# Patient Record
Sex: FEMALE | Race: BLACK OR AFRICAN AMERICAN | NOT HISPANIC OR LATINO | Employment: FULL TIME | URBAN - METROPOLITAN AREA
[De-identification: names, ages, dates, MRNs, and addresses within clinical notes are randomized per-mention and may not be internally consistent; named-entity substitution may affect disease eponyms.]

---

## 2018-02-04 ENCOUNTER — HOSPITAL ENCOUNTER (EMERGENCY)
Facility: HOSPITAL | Age: 58
Discharge: HOME | End: 2018-02-04
Attending: EMERGENCY MEDICINE
Payer: MEDICAID

## 2018-02-04 VITALS
TEMPERATURE: 98 F | RESPIRATION RATE: 18 BRPM | SYSTOLIC BLOOD PRESSURE: 156 MMHG | HEIGHT: 65 IN | WEIGHT: 260 LBS | OXYGEN SATURATION: 100 % | BODY MASS INDEX: 43.32 KG/M2 | DIASTOLIC BLOOD PRESSURE: 63 MMHG | HEART RATE: 96 BPM

## 2018-02-04 DIAGNOSIS — H60.502 ACUTE OTITIS EXTERNA OF LEFT EAR, UNSPECIFIED TYPE: ICD-10-CM

## 2018-02-04 DIAGNOSIS — I10 HYPERTENSION: ICD-10-CM

## 2018-02-04 DIAGNOSIS — H81.392 PERIPHERAL VERTIGO INVOLVING LEFT EAR: ICD-10-CM

## 2018-02-04 DIAGNOSIS — R07.9 CHEST PAIN, UNSPECIFIED TYPE: ICD-10-CM

## 2018-02-04 DIAGNOSIS — N76.2 CELLULITIS OF LABIA: Primary | ICD-10-CM

## 2018-02-04 LAB
ALBUMIN SERPL BCP-MCNC: 3.3 G/DL
ALP SERPL-CCNC: 116 U/L
ALT SERPL W/O P-5'-P-CCNC: 18 U/L
ANION GAP SERPL CALC-SCNC: 9 MMOL/L
AST SERPL-CCNC: 16 U/L
BACTERIA #/AREA URNS HPF: ABNORMAL /HPF
BASOPHILS # BLD AUTO: 0.04 K/UL
BASOPHILS NFR BLD: 0.5 %
BILIRUB SERPL-MCNC: 0.3 MG/DL
BILIRUB UR QL STRIP: NEGATIVE
BNP SERPL-MCNC: <10 PG/ML
BUN SERPL-MCNC: 11 MG/DL
CALCIUM SERPL-MCNC: 9.1 MG/DL
CHLORIDE SERPL-SCNC: 104 MMOL/L
CLARITY UR: ABNORMAL
CO2 SERPL-SCNC: 25 MMOL/L
COLOR UR: YELLOW
CREAT SERPL-MCNC: 0.9 MG/DL
DIFFERENTIAL METHOD: ABNORMAL
EOSINOPHIL # BLD AUTO: 0.2 K/UL
EOSINOPHIL NFR BLD: 3.1 %
ERYTHROCYTE [DISTWIDTH] IN BLOOD BY AUTOMATED COUNT: 14 %
EST. GFR  (AFRICAN AMERICAN): >60 ML/MIN/1.73 M^2
EST. GFR  (NON AFRICAN AMERICAN): >60 ML/MIN/1.73 M^2
GLUCOSE SERPL-MCNC: 281 MG/DL
GLUCOSE UR QL STRIP: ABNORMAL
HCT VFR BLD AUTO: 36.1 %
HGB BLD-MCNC: 12.7 G/DL
HGB UR QL STRIP: NEGATIVE
INR PPP: 1.1
KETONES UR QL STRIP: NEGATIVE
LEUKOCYTE ESTERASE UR QL STRIP: NEGATIVE
LYMPHOCYTES # BLD AUTO: 3.6 K/UL
LYMPHOCYTES NFR BLD: 48.4 %
MCH RBC QN AUTO: 28.6 PG
MCHC RBC AUTO-ENTMCNC: 35.2 G/DL
MCV RBC AUTO: 81 FL
MICROSCOPIC COMMENT: ABNORMAL
MONOCYTES # BLD AUTO: 0.4 K/UL
MONOCYTES NFR BLD: 5.3 %
NEUTROPHILS # BLD AUTO: 3.1 K/UL
NEUTROPHILS NFR BLD: 42.4 %
NITRITE UR QL STRIP: NEGATIVE
PH UR STRIP: 6 [PH] (ref 5–8)
PLATELET # BLD AUTO: 281 K/UL
PMV BLD AUTO: 9.7 FL
POTASSIUM SERPL-SCNC: 4 MMOL/L
PROT SERPL-MCNC: 6.8 G/DL
PROT UR QL STRIP: ABNORMAL
PROTHROMBIN TIME: 11.2 SEC
RBC # BLD AUTO: 4.44 M/UL
RBC #/AREA URNS HPF: 0 /HPF (ref 0–4)
SODIUM SERPL-SCNC: 138 MMOL/L
SP GR UR STRIP: >=1.03 (ref 1–1.03)
SQUAMOUS #/AREA URNS HPF: 20 /HPF
TROPONIN I SERPL DL<=0.01 NG/ML-MCNC: <0.006 NG/ML
TROPONIN I SERPL DL<=0.01 NG/ML-MCNC: <0.006 NG/ML
URN SPEC COLLECT METH UR: ABNORMAL
UROBILINOGEN UR STRIP-ACNC: NEGATIVE EU/DL
WBC # BLD AUTO: 7.37 K/UL
WBC #/AREA URNS HPF: 3 /HPF (ref 0–5)
YEAST URNS QL MICRO: ABNORMAL

## 2018-02-04 PROCEDURE — 25000003 PHARM REV CODE 250: Performed by: EMERGENCY MEDICINE

## 2018-02-04 PROCEDURE — 96375 TX/PRO/DX INJ NEW DRUG ADDON: CPT

## 2018-02-04 PROCEDURE — 80053 COMPREHEN METABOLIC PANEL: CPT

## 2018-02-04 PROCEDURE — 81000 URINALYSIS NONAUTO W/SCOPE: CPT

## 2018-02-04 PROCEDURE — 85610 PROTHROMBIN TIME: CPT

## 2018-02-04 PROCEDURE — 93010 ELECTROCARDIOGRAM REPORT: CPT | Mod: ,,, | Performed by: INTERNAL MEDICINE

## 2018-02-04 PROCEDURE — 85025 COMPLETE CBC W/AUTO DIFF WBC: CPT

## 2018-02-04 PROCEDURE — 84484 ASSAY OF TROPONIN QUANT: CPT | Mod: 91

## 2018-02-04 PROCEDURE — S0077 INJECTION, CLINDAMYCIN PHOSP: HCPCS | Performed by: EMERGENCY MEDICINE

## 2018-02-04 PROCEDURE — 83880 ASSAY OF NATRIURETIC PEPTIDE: CPT

## 2018-02-04 PROCEDURE — 93005 ELECTROCARDIOGRAM TRACING: CPT

## 2018-02-04 PROCEDURE — 96365 THER/PROPH/DIAG IV INF INIT: CPT

## 2018-02-04 PROCEDURE — 63600175 PHARM REV CODE 636 W HCPCS: Performed by: EMERGENCY MEDICINE

## 2018-02-04 PROCEDURE — 99284 EMERGENCY DEPT VISIT MOD MDM: CPT | Mod: 25

## 2018-02-04 RX ORDER — KETOROLAC TROMETHAMINE 30 MG/ML
15 INJECTION, SOLUTION INTRAMUSCULAR; INTRAVENOUS
Status: COMPLETED | OUTPATIENT
Start: 2018-02-04 | End: 2018-02-04

## 2018-02-04 RX ORDER — HYDROCODONE BITARTRATE AND ACETAMINOPHEN 5; 325 MG/1; MG/1
1 TABLET ORAL EVERY 6 HOURS PRN
COMMUNITY

## 2018-02-04 RX ORDER — PROCHLORPERAZINE EDISYLATE 5 MG/ML
10 INJECTION INTRAMUSCULAR; INTRAVENOUS
Status: COMPLETED | OUTPATIENT
Start: 2018-02-04 | End: 2018-02-04

## 2018-02-04 RX ORDER — LISINOPRIL 10 MG/1
10 TABLET ORAL DAILY
COMMUNITY

## 2018-02-04 RX ORDER — METHOCARBAMOL 500 MG/1
500 TABLET, FILM COATED ORAL 4 TIMES DAILY
COMMUNITY

## 2018-02-04 RX ORDER — AMLODIPINE BESYLATE 10 MG/1
10 TABLET ORAL DAILY
COMMUNITY

## 2018-02-04 RX ORDER — CLINDAMYCIN HYDROCHLORIDE 150 MG/1
450 CAPSULE ORAL EVERY 8 HOURS
Qty: 63 CAPSULE | Refills: 0 | Status: SHIPPED | OUTPATIENT
Start: 2018-02-04 | End: 2018-02-11

## 2018-02-04 RX ORDER — PAROXETINE HYDROCHLORIDE HEMIHYDRATE 12.5 MG/1
12.5 TABLET, FILM COATED, EXTENDED RELEASE ORAL EVERY MORNING
COMMUNITY

## 2018-02-04 RX ORDER — ASPIRIN 325 MG
325 TABLET ORAL
Status: DISCONTINUED | OUTPATIENT
Start: 2018-02-04 | End: 2018-02-04 | Stop reason: HOSPADM

## 2018-02-04 RX ORDER — CYCLOBENZAPRINE HCL 10 MG
10 TABLET ORAL 3 TIMES DAILY PRN
COMMUNITY

## 2018-02-04 RX ORDER — MECLIZINE HYDROCHLORIDE 25 MG/1
25 TABLET ORAL
Status: DISCONTINUED | OUTPATIENT
Start: 2018-02-04 | End: 2018-02-04 | Stop reason: HOSPADM

## 2018-02-04 RX ORDER — CLINDAMYCIN PHOSPHATE 900 MG/50ML
900 INJECTION, SOLUTION INTRAVENOUS
Status: COMPLETED | OUTPATIENT
Start: 2018-02-04 | End: 2018-02-04

## 2018-02-04 RX ORDER — MECLIZINE HYDROCHLORIDE 25 MG/1
25 TABLET ORAL EVERY 8 HOURS PRN
Qty: 14 TABLET | Refills: 0 | Status: SHIPPED | OUTPATIENT
Start: 2018-02-04

## 2018-02-04 RX ADMIN — SODIUM CHLORIDE 1000 ML: 0.9 INJECTION, SOLUTION INTRAVENOUS at 04:02

## 2018-02-04 RX ADMIN — PROCHLORPERAZINE EDISYLATE 10 MG: 5 INJECTION INTRAMUSCULAR; INTRAVENOUS at 04:02

## 2018-02-04 RX ADMIN — KETOROLAC TROMETHAMINE 15 MG: 30 INJECTION, SOLUTION INTRAMUSCULAR at 04:02

## 2018-02-04 RX ADMIN — CLINDAMYCIN IN 5 PERCENT DEXTROSE 900 MG: 18 INJECTION, SOLUTION INTRAVENOUS at 04:02

## 2018-02-04 NOTE — ED PROVIDER NOTES
Encounter Date: 2/4/2018       History     Chief Complaint   Patient presents with    Dizziness     pt referred to ed from urgent care in [facility] after being seen and evaluated for episodes of dizziness since december, left sided jaw and ear pain, chest discomfort, and hypertension.     Hypertension     57-year-old female sent to the emergency Department from urgent care for evaluation of multiple complaints.  Patient initially went to urgent care for swelling to her right labia she believes may bean infection.  She reports onset of week or 2 ago, but it is worsened over the last 24-36 hours.  She describes a constant aching pain to the area that is worse with movement and palpation, no alleviating factors reported.o drainage.  No fever.  Patient also reports a right-sided ear pain that radiates into her neck.  She reports that his been there for a few weeks, and she has had intermittent drainage from her left ear.  Reports the pain is throbbing and radiating, worse with movement of her head and without alleviating factors. Also reports that she has some intermittent dizziness and feels that she is leaning towards her left.  This is accompanied by nausea.  It is elicited by moving her head towards the left, but is only intermittent.  Last notices symptoms morning.  She was noted to be somewhat hypertensive at urgent care and she did admit to months of intermittent aching left-sided chest pain.  She does report she has had that symptom today, less noticed a few hours prior to arrival.  No shortness of breath.  Not exertional.  No exacerbating, alleviating, or eliciting factors noted, she just reports that it sometimes hurts in her left chest and aches. Denies any recent cough, congestion, fever, shortness of breath, nausea, vomiting.  No other symptoms reported.  Has not seen her primary care physician for these complaints.          Review of patient's allergies indicates:  No Known Allergies  Past Medical History:    Diagnosis Date    Depression     Hiatal hernia     Hypertension      Past Surgical History:   Procedure Laterality Date    bladder lift      CYST REMOVAL      HYSTERECTOMY      partia    JOINT REPLACEMENT       History reviewed. No pertinent family history.  Social History   Substance Use Topics    Smoking status: Never Smoker    Smokeless tobacco: Never Used    Alcohol use No     Review of Systems   Constitutional: Negative for chills, fatigue and fever.   HENT: Positive for congestion, ear discharge and ear pain. Negative for sore throat and voice change.    Eyes: Negative for photophobia, pain and redness.   Respiratory: Negative for cough, choking and shortness of breath.    Cardiovascular: Positive for chest pain. Negative for palpitations and leg swelling.   Gastrointestinal: Negative for abdominal pain, diarrhea and vomiting.   Genitourinary: Negative for dysuria, frequency and urgency.   Musculoskeletal: Positive for neck pain (Left sided). Negative for back pain and neck stiffness.   Neurological: Negative for seizures, speech difficulty, light-headedness, numbness and headaches.   All other systems reviewed and are negative.      Physical Exam     Initial Vitals [02/04/18 1427]   BP Pulse Resp Temp SpO2   (!) 225/108 88 20 96.4 °F (35.8 °C) 98 %      MAP       147         Physical Exam    Nursing note and vitals reviewed.  Constitutional: She appears well-developed and well-nourished. No distress.   HENT:   Head: Normocephalic and atraumatic.   Oropharynx clear; Dry MM; L TM erythematous, partially obscured by cerumen   Eyes: Conjunctivae and EOM are normal. Pupils are equal, round, and reactive to light.   Neck: Normal range of motion. Neck supple. No tracheal deviation present.   Cardiovascular: Normal rate, regular rhythm, normal heart sounds and intact distal pulses.   Pulmonary/Chest: Breath sounds normal. No respiratory distress. She has no wheezes. She has no rhonchi. She has no rales.    Abdominal: Soft. Bowel sounds are normal. She exhibits no distension. There is no tenderness.   Genitourinary:   Genitourinary Comments: Minimal swelling to the right labia, somewhat erythematous and tender, without palpable or drainable abscess   Musculoskeletal: Normal range of motion. She exhibits no tenderness.   Neurological: She is alert and oriented to person, place, and time. She has normal strength. No cranial nerve deficit or sensory deficit.   Skin: Skin is warm and dry. Capillary refill takes less than 2 seconds.         ED Course   Procedures  Labs Reviewed   CBC W/ AUTO DIFFERENTIAL - Abnormal; Notable for the following:        Result Value    Hematocrit 36.1 (*)     MCV 81 (*)     Lymph% 48.4 (*)     All other components within normal limits   COMPREHENSIVE METABOLIC PANEL - Abnormal; Notable for the following:     Glucose 281 (*)     Albumin 3.3 (*)     All other components within normal limits   URINALYSIS - Abnormal; Notable for the following:     Appearance, UA Hazy (*)     Specific Gravity, UA >=1.030 (*)     Protein, UA Trace (*)     Glucose, UA 3+ (*)     All other components within normal limits   URINALYSIS MICROSCOPIC - Abnormal; Notable for the following:     Bacteria, UA Moderate (*)     All other components within normal limits   PROTIME-INR   B-TYPE NATRIURETIC PEPTIDE   TROPONIN I   TROPONIN I     EKG Readings: (Independently Interpreted)   Initial Reading: No STEMI. Previous EKG: Compared with most recent EKG Previous EKG Date: 5/24/15 (Nonspecific changes) . Rhythm: Normal Sinus Rhythm. Heart Rate: 90. Ectopy: No Ectopy. Conduction: Normal. ST Segments: Normal ST Segments. T Waves: Normal. Axis: Normal.       X-Rays:   Independently Interpreted Readings:   Chest X-Ray: CXR Interpreted by radiologist and visualized by me:      Imaging Results          X-Ray Chest PA And Lateral (Final result)  Result time 02/04/18 15:00:31    Final result by Yahaira Carson MD (02/04/18 15:00:31)                  Impression:     Normal exam      Electronically signed by: RUBI KAISER MD  Date:     02/04/18  Time:    15:00              Narrative:    Procedure: PA and lateral chest.  This is interpreted without the benefit of similar prior studies for comparison .    Findings: PA and lateral radiographs of the chest demonstrate symmetrically inflated lungs with no mass, nodule, pneumothorax, infiltrate or effusion.  The cardiomediastinal silhouette, osseous and soft tissue structures are normal.                              Medical Decision Making:   Initial Assessment:   57-year-old female presents emergency department complaining of swelling and pain to her right labia, left ear pain radiating to her neck, dizziness, chest pain.  Differential Diagnosis:   ACS, dissection, otitis media, otitis externa, peripheral versus central vertigo, pneumonia, electrolyte dyscrasia, arrhythmia  Independently Interpreted Test(s):   I have ordered and independently interpreted X-rays - see prior notes.  I have ordered and independently interpreted EKG Reading(s) - see prior notes  Clinical Tests:   Lab Tests: Reviewed       <> Summary of Lab: benign  ED Management:  Patient given IV fluid, antibiotics,Toradol, Compazine.  She reports resolution of all of her symptoms at this time except for the labial swelling.  We discussed disposition including home management, prescription for meclizine and clindamycin, instructions to follow-up with her primary care physician, reasons return to emergency room.  Patient expressed good understanding and is comfortable with discharge at this time.                   ED Course      Clinical Impression:   The primary encounter diagnosis was Cellulitis of labia. Diagnoses of Hypertension, Acute otitis externa of left ear, unspecified type, Chest pain, unspecified type, and Peripheral vertigo involving left ear were also pertinent to this visit.    Disposition:   Disposition:  Discharged  Condition: Stable                        Prasad Suazo MD  02/04/18 [id number]

## 2018-02-04 NOTE — ED NOTES
Patient identifiers verified and correct for Leann Bethea.  Pt sitting up in bed, AAO x's 3. Pt stated that she came to the ER with c/o abscess to her vaginal area and left sided ear pain that she can feel in her chest. NAD noted. V/S stable. Pt reports some dizziness following the ear discomfort that she has been having for about a week.   LOC: The patient is awake, alert and aware of environment with an appropriate affect, the patient is oriented x 3 and speaking appropriately.  APPEARANCE: Patient resting comfortably and in no acute distress, patient is clean and well groomed, patient's clothing is properly fastened.  SKIN: The skin is warm and dry, color consistent with ethnicity, patient has normal skin turgor and moist mucus membranes, skin intact, no breakdown or bruising noted.  MUSCULOSKELETAL: Patient moving all extremities spontaneously, no obvious swelling or deformities noted.  RESPIRATORY: Airway is open and patent, respirations are spontaneous, patient has a normal effort and rate, no accessory muscle use noted, bilateral breath sounds clear.  CARDIAC: Patient has a normal rate and regular rhythm, no periphreal edema noted, capillary refill < 3 seconds.  ABDOMEN: Soft and non tender to palpation, no distention noted, normoactive bowel sounds present in all four quadrants.  NEUROLOGIC: PERRL,  3 mm bilaterally, eyes open spontaneously, behavior appropriate to situation, follows commands, facial expression symmetrical, bilateral hand grasp equal and even, purposeful motor response noted, normal sensation in all extremities when touched with a finger.

## 2018-02-09 ENCOUNTER — TELEPHONE (OUTPATIENT)
Dept: NEUROLOGY | Facility: CLINIC | Age: 58
End: 2018-02-09

## 2018-02-09 NOTE — TELEPHONE ENCOUNTER
LM for patient to contact our office to discuss rescheduling appointment of 2-23 with Neurosurgery department for spine/neck/back problems. Patient can also be seen at back/spine clinic at [facility].

## 2022-06-07 ENCOUNTER — TELEPHONE (OUTPATIENT)
Dept: SPORTS MEDICINE | Facility: CLINIC | Age: 62
End: 2022-06-07
Payer: MEDICAID

## 2022-06-07 DIAGNOSIS — M25.512 LEFT SHOULDER PAIN, UNSPECIFIED CHRONICITY: Primary | ICD-10-CM

## 2022-06-07 NOTE — TELEPHONE ENCOUNTER
LVM requesting patient to come in 30 minutes early prior to appointment to have imaging done. Orders have been placed and appointment has been scheduled.

## 2022-06-10 ENCOUNTER — OFFICE VISIT (OUTPATIENT)
Dept: SPORTS MEDICINE | Facility: CLINIC | Age: 62
End: 2022-06-10
Payer: MEDICAID

## 2022-06-10 VITALS — WEIGHT: 241.5 LBS | BODY MASS INDEX: 40.24 KG/M2 | HEIGHT: 65 IN

## 2022-06-10 DIAGNOSIS — M54.2 NECK PAIN: ICD-10-CM

## 2022-06-10 DIAGNOSIS — M54.2 CHRONIC CERVICAL PAIN: ICD-10-CM

## 2022-06-10 DIAGNOSIS — M25.512 ACUTE PAIN OF LEFT SHOULDER: ICD-10-CM

## 2022-06-10 DIAGNOSIS — G89.29 CHRONIC CERVICAL PAIN: ICD-10-CM

## 2022-06-10 PROCEDURE — 3008F PR BODY MASS INDEX (BMI) DOCUMENTED: ICD-10-PCS | Mod: CPTII,,, | Performed by: ORTHOPAEDIC SURGERY

## 2022-06-10 PROCEDURE — 99999 PR PBB SHADOW E&M-EST. PATIENT-LVL III: CPT | Mod: PBBFAC,,, | Performed by: ORTHOPAEDIC SURGERY

## 2022-06-10 PROCEDURE — 99204 PR OFFICE/OUTPT VISIT, NEW, LEVL IV, 45-59 MIN: ICD-10-PCS | Mod: S$PBB,,, | Performed by: ORTHOPAEDIC SURGERY

## 2022-06-10 PROCEDURE — 99999 PR PBB SHADOW E&M-EST. PATIENT-LVL III: ICD-10-PCS | Mod: PBBFAC,,, | Performed by: ORTHOPAEDIC SURGERY

## 2022-06-10 PROCEDURE — 1159F MED LIST DOCD IN RCRD: CPT | Mod: CPTII,,, | Performed by: ORTHOPAEDIC SURGERY

## 2022-06-10 PROCEDURE — 1160F RVW MEDS BY RX/DR IN RCRD: CPT | Mod: CPTII,,, | Performed by: ORTHOPAEDIC SURGERY

## 2022-06-10 PROCEDURE — 1159F PR MEDICATION LIST DOCUMENTED IN MEDICAL RECORD: ICD-10-PCS | Mod: CPTII,,, | Performed by: ORTHOPAEDIC SURGERY

## 2022-06-10 PROCEDURE — 99204 OFFICE O/P NEW MOD 45 MIN: CPT | Mod: S$PBB,,, | Performed by: ORTHOPAEDIC SURGERY

## 2022-06-10 PROCEDURE — 99213 OFFICE O/P EST LOW 20 MIN: CPT | Mod: PBBFAC,PN | Performed by: ORTHOPAEDIC SURGERY

## 2022-06-10 PROCEDURE — 3008F BODY MASS INDEX DOCD: CPT | Mod: CPTII,,, | Performed by: ORTHOPAEDIC SURGERY

## 2022-06-10 PROCEDURE — 1160F PR REVIEW ALL MEDS BY PRESCRIBER/CLIN PHARMACIST DOCUMENTED: ICD-10-PCS | Mod: CPTII,,, | Performed by: ORTHOPAEDIC SURGERY

## 2022-06-10 NOTE — PROGRESS NOTES
"Subjective:          Chief Complaint: Leann Bethea is a 61 y.o. female who had concerns including Pain of the Left Shoulder.    HPI     Patient presents to clinic with chronic neck and left shoulder pain x several years. Patient states she has had pain in her neck that radiates down her arm for several years.  She obtain MRI last year which she does not have with her, and was told that she does have prominent abnormalities with her spine, but does not recall exactly what.  She states the pain in her shoulder started about a month ago, when she was lifting herself out of her tub and  "felt a pop".  and is localized along the lateral aspect of her shoulder and radiates down to her hands. She previously saw orthopedic, and was told the source of pain is likely in her neck.  Pain and stiffness as greatly improved since the initial injury.  For her neck pain, she states she previously saw a chiropractor and 2020 which gave her some relief.  She rates the pain as 10/10, made worse with any use of her left arm. She has attempted multiple conservative measures that include activity modification, ice & elevation, and oral medications (ibuprofen). She denies any mechanical symptoms to include locking and catching or instability.  Is affecting ADLs and limiting desired level of activity. Denies numbness, tingling, radiation, and inability to bear weight. She is here today to discuss treatment options.    No previous surgeries or trauma on Left Shoulder        Review of Systems   Constitutional: Negative.   HENT: Negative.    Eyes: Negative.    Cardiovascular: Negative.    Respiratory: Negative.    Endocrine: Negative.    Hematologic/Lymphatic: Negative.    Skin: Negative.    Musculoskeletal: Positive for back pain, joint pain, joint swelling and neck pain. Negative for falls.   Neurological: Negative.    Psychiatric/Behavioral: Negative.    Allergic/Immunologic: Negative.                    Objective:        General: Leann " is well-developed, well-nourished, appears stated age, in no acute distress, alert and oriented to time, place and person.     General    Nursing note and vitals reviewed.  Constitutional: She is oriented to person, place, and time. She appears well-developed and well-nourished. No distress.   HENT:   Head: Normocephalic and atraumatic.   Nose: Nose normal.   Eyes: EOM are normal.   Cardiovascular: Intact distal pulses.    Pulmonary/Chest: Effort normal. No respiratory distress.   Neurological: She is alert and oriented to person, place, and time.   Psychiatric: She has a normal mood and affect. Her behavior is normal. Judgment and thought content normal.         Back (L-Spine & T-Spine) / Neck (C-Spine) Exam     Tenderness   The patient is tender to palpation of the left trapezial.   Right Shoulder Exam     Inspection/Observation   Swelling: absent  Bruising: absent  Scars: absent  Deformity: absent  Scapular Winging: absent  Scapular Dyskinesia: negative  Atrophy: absent    Tenderness   The patient is experiencing no tenderness.    Range of Motion   Active abduction: 170   Passive abduction: 170   Forward Flexion: 180   Forward Elevation: 180  External Rotation 0 degrees: 60   Internal rotation 0 degrees: Mid thoracic     Other   Sensation: normal    Left Shoulder Exam     Inspection/Observation   Swelling: absent  Bruising: absent  Scars: absent  Deformity: absent  Scapular Winging: absent  Scapular Dyskinesia: negative  Atrophy: absent    Tenderness   The patient is tender to palpation of the acromioclavicular joint and greater tuberosity.    Range of Motion   Active abduction: 120   Passive abduction: 120   Forward Flexion: 120   Forward Elevation: 120  External Rotation 0 degrees: 50   Internal rotation 0 degrees: Lumbar     Tests & Signs   Drop arm: negative  Carey test: negative  Impingement: positive  Rotator Cuff Painful Arc/Range: moderate  Belly Press: negative  Active Compression test ([place]'s  Sign): negative  Speed's Test: negative  Bear Hug: negative    Other   Sensation: normal       Muscle Strength   Right Upper Extremity   Shoulder Abduction: 5/5   Shoulder Internal Rotation: 5/5   Shoulder External Rotation: 5/5   Supraspinatus: 5/5   Subscapularis: 5/5   Biceps: 5/5   Left Upper Extremity  Shoulder Abduction: 4/5   Shoulder Internal Rotation: 4/5   Shoulder External Rotation: 4/5   Supraspinatus: 4/5   Subscapularis: 4/5   Biceps: 4/5     Vascular Exam     Right Pulses      Radial:                    2+      Left Pulses      Radial:                    2+      Capillary Refill  Right Hand: normal capillary refill  Left Hand: normal capillary refill        Radiographs left shoulder    My interpretation:    No signs of fracture, contusion, swelling, DJD, or any other bony abnormalities noted.            Assessment:       Encounter Diagnoses   Name Primary?    Left shoulder pain     Neck pain     Chronic cervical pain           Plan:       1. I made the decision to order new imaging of the extremity or extremities evaluated. I independently reviewed and interpreted the radiographs and/or MRIs today. These images were shown to the patient where I then discussed my findings in detail.    2. We discussed at length different treatment options including conservative vs surgical management. These include anti-inflammatories, acetaminophen, rest, ice, heat, formal physical therapy including strengthening and stretching exercises, home exercise programs, dry needling, and finally surgical intervention. Explained to patient that based on her description of the injury and physical exam findings, I do not believe there is a significant injury sustained to the shoulder the source of her pain is coming primarily from her neck.  I recommend seen a back and spine specialists given her extensive history with neck pain and spine pathologies.  After which, explained of be happy to send her to physical therapy for her  shoulder pain.  Patient expressed understanding with the above and will see back and spine.    3. Referral placed to see back and spine.  Patient has an appointment scheduled next week       4. RTC to see Franc Quan PA-C in 12 weeks for follow-up.  Will reassess left shoulder pain and determine if patient could benefit from formal PT.        All of the patient's questions were answered. Patient was advised to call the clinic or contact me through the patient portal for any questions or concerns.       Medical Dictation software was used during the dictation of portions or the entirety of this medical record.  Phonetic or grammatic errors may exist due to the use of this software. For clarification, refer to the author of the document.                      Patient questionnaires may have been collected.

## 2022-08-30 ENCOUNTER — OFFICE VISIT (OUTPATIENT)
Dept: SPORTS MEDICINE | Facility: CLINIC | Age: 62
End: 2022-08-30
Payer: MEDICAID

## 2022-08-30 VITALS — BODY MASS INDEX: 40.37 KG/M2 | TEMPERATURE: 99 F | WEIGHT: 242.31 LBS | HEIGHT: 65 IN

## 2022-08-30 DIAGNOSIS — G89.29 CHRONIC CERVICAL PAIN: ICD-10-CM

## 2022-08-30 DIAGNOSIS — M54.2 NECK PAIN: Primary | ICD-10-CM

## 2022-08-30 DIAGNOSIS — M54.2 CHRONIC CERVICAL PAIN: ICD-10-CM

## 2022-08-30 DIAGNOSIS — M25.512 ACUTE PAIN OF LEFT SHOULDER: ICD-10-CM

## 2022-08-30 PROCEDURE — 99999 PR PBB SHADOW E&M-EST. PATIENT-LVL III: ICD-10-PCS | Mod: PBBFAC,,, | Performed by: ORTHOPAEDIC SURGERY

## 2022-08-30 PROCEDURE — 99999 PR PBB SHADOW E&M-EST. PATIENT-LVL III: CPT | Mod: PBBFAC,,, | Performed by: ORTHOPAEDIC SURGERY

## 2022-08-30 PROCEDURE — 99212 PR OFFICE/OUTPT VISIT, EST, LEVL II, 10-19 MIN: ICD-10-PCS | Mod: S$PBB,,, | Performed by: ORTHOPAEDIC SURGERY

## 2022-08-30 PROCEDURE — 1160F PR REVIEW ALL MEDS BY PRESCRIBER/CLIN PHARMACIST DOCUMENTED: ICD-10-PCS | Mod: CPTII,,, | Performed by: ORTHOPAEDIC SURGERY

## 2022-08-30 PROCEDURE — 3008F PR BODY MASS INDEX (BMI) DOCUMENTED: ICD-10-PCS | Mod: CPTII,,, | Performed by: ORTHOPAEDIC SURGERY

## 2022-08-30 PROCEDURE — 1159F MED LIST DOCD IN RCRD: CPT | Mod: CPTII,,, | Performed by: ORTHOPAEDIC SURGERY

## 2022-08-30 PROCEDURE — 99212 OFFICE O/P EST SF 10 MIN: CPT | Mod: S$PBB,,, | Performed by: ORTHOPAEDIC SURGERY

## 2022-08-30 PROCEDURE — 99213 OFFICE O/P EST LOW 20 MIN: CPT | Mod: PBBFAC,PN | Performed by: ORTHOPAEDIC SURGERY

## 2022-08-30 PROCEDURE — 1159F PR MEDICATION LIST DOCUMENTED IN MEDICAL RECORD: ICD-10-PCS | Mod: CPTII,,, | Performed by: ORTHOPAEDIC SURGERY

## 2022-08-30 PROCEDURE — 3008F BODY MASS INDEX DOCD: CPT | Mod: CPTII,,, | Performed by: ORTHOPAEDIC SURGERY

## 2022-08-30 PROCEDURE — 1160F RVW MEDS BY RX/DR IN RCRD: CPT | Mod: CPTII,,, | Performed by: ORTHOPAEDIC SURGERY

## 2022-08-30 RX ORDER — METFORMIN HYDROCHLORIDE 500 MG/1
500 TABLET, EXTENDED RELEASE ORAL EVERY MORNING
COMMUNITY
Start: 2022-06-15

## 2022-08-30 NOTE — PROGRESS NOTES
"Subjective:          Chief Complaint: Leann Bethea is a 62 y.o. female who had concerns including Follow-up of the Left Shoulder.    HPI     Patient presents today for follow-up of neck and left shoulder pain. Patient had an appointment scheduled with Denny Osorio MD on 07/06/2022, for which she did not attend and stated she thought she was seen back and spine today.  She has been attending formal physical therapy at [facility], concentrating mostly on the neck.  She states this is giving her mild relief.    Interval history 06/10/2022:  Patient presents to clinic with chronic neck and left shoulder pain x several years. Patient states she has had pain in her neck that radiates down her arm for several years.  She obtain MRI last year which she does not have with her, and was told that she does have prominent abnormalities with her spine, but does not recall exactly what.  She states the pain in her shoulder started about a month ago, when she was lifting herself out of her tub and  "felt a pop".  and is localized along the lateral aspect of her shoulder and radiates down to her hands. She previously saw orthopedic, and was told the source of pain is likely in her neck.  Pain and stiffness as greatly improved since the initial injury.  For her neck pain, she states she previously saw a chiropractor and 2020 which gave her some relief.  She rates the pain as 10/10, made worse with any use of her left arm. She has attempted multiple conservative measures that include activity modification, ice & elevation, and oral medications (ibuprofen). She denies any mechanical symptoms to include locking and catching or instability.  Is affecting ADLs and limiting desired level of activity. Denies numbness, tingling, radiation, and inability to bear weight. She is here today to discuss treatment options.    No previous surgeries or trauma on Left Shoulder        Review of Systems   Constitutional: Negative.   HENT: Negative.  "    Eyes: Negative.    Cardiovascular: Negative.    Respiratory: Negative.     Endocrine: Negative.    Hematologic/Lymphatic: Negative.    Skin: Negative.    Musculoskeletal:  Positive for back pain, joint pain, joint swelling and neck pain. Negative for falls.   Neurological: Negative.    Psychiatric/Behavioral: Negative.     Allergic/Immunologic: Negative.                  Objective:        General: Leann is well-developed, well-nourished, appears stated age, in no acute distress, alert and oriented to time, place and person.     General    Nursing note and vitals reviewed.  Constitutional: She is oriented to person, place, and time. She appears well-developed and well-nourished. No distress.   HENT:   Head: Normocephalic and atraumatic.   Nose: Nose normal.   Eyes: EOM are normal.   Cardiovascular:  Intact distal pulses.            Pulmonary/Chest: Effort normal. No respiratory distress.   Neurological: She is alert and oriented to person, place, and time.   Psychiatric: She has a normal mood and affect. Her behavior is normal. Judgment and thought content normal.         Back (L-Spine & T-Spine) / Neck (C-Spine) Exam     Tenderness   The patient is tender to palpation of the left trapezial.   Right Shoulder Exam     Inspection/Observation   Swelling: absent  Bruising: absent  Scars: absent  Deformity: absent  Scapular Winging: absent  Scapular Dyskinesia: negative  Atrophy: absent    Tenderness   The patient is experiencing no tenderness.    Range of Motion   Active abduction:  170   Passive abduction:  170   Forward Flexion:  180   Forward Elevation: 180  External Rotation 0 degrees:  60   Internal rotation 0 degrees:  Mid thoracic     Other   Sensation: normal    Left Shoulder Exam     Inspection/Observation   Swelling: absent  Bruising: absent  Scars: absent  Deformity: absent  Scapular Winging: absent  Scapular Dyskinesia: negative  Atrophy: absent    Tenderness   The patient is tender to palpation of the  acromioclavicular joint and greater tuberosity.    Range of Motion   Active abduction:  120   Passive abduction:  120   Forward Flexion:  120   Forward Elevation: 120  External Rotation 0 degrees:  50   Internal rotation 0 degrees:  Lumbar     Tests & Signs   Drop arm: negative  Carey test: negative  Impingement: positive  Rotator Cuff Painful Arc/Range: moderate  Belly Press: negative  Active Compression test ([place]'s Sign): negative  Speed's Test: negative  Bear Hug: negative    Other   Sensation: normal       Muscle Strength   Right Upper Extremity   Shoulder Abduction: 5/5   Shoulder Internal Rotation: 5/5   Shoulder External Rotation: 5/5   Supraspinatus: 5/5   Subscapularis: 5/5   Biceps: 5/5   Left Upper Extremity  Shoulder Abduction: 4/5   Shoulder Internal Rotation: 4/5   Shoulder External Rotation: 4/5   Supraspinatus: 4/5   Subscapularis: 4/5   Biceps: 4/5     Vascular Exam     Right Pulses      Radial:                    2+      Left Pulses      Radial:                    2+      Capillary Refill  Right Hand: normal capillary refill  Left Hand: normal capillary refill    Previous Radiographs left shoulder    My interpretation:    No signs of fracture, contusion, swelling, DJD, or any other bony abnormalities noted.            Assessment:       Encounter Diagnoses   Name Primary?    Neck pain Yes    Chronic cervical pain     Acute pain of left shoulder             Plan:       1. We again discussed at length different treatment options including conservative vs surgical management. These include anti-inflammatories, acetaminophen, rest, ice, heat, formal physical therapy including strengthening and stretching exercises, home exercise programs, dry needling, and finally surgical intervention.  I again explained to patient that based on her description of the injury and physical exam findings, I do not believe there is a significant injury sustained to the shoulder the source of her pain is coming  primarily from her neck.  I recommend seen a back and spine specialists given her extensive history with neck pain and spine pathologies.  After which, explained of be happy to send her to physical therapy for her shoulder pain.  Patient expressed understanding with the above and will see back and spine.    2. Patient was given the phone number to the office of Denny Osorio MD to call so she can reschedule her previous appointment.    3.  RTC to see Franc waters.  Patient will contact our clinic in the future, after her neck pain is resolved and she is rated be seen for        All of the patient's questions were answered. Patient was advised to call the clinic or contact me through the patient portal for any questions or concerns.       Medical Dictation software was used during the dictation of portions or the entirety of this medical record.  Phonetic or grammatic errors may exist due to the use of this software. For clarification, refer to the author of the document.                      Patient questionnaires may have been collected.

## 2022-10-05 ENCOUNTER — HOSPITAL ENCOUNTER (OUTPATIENT)
Dept: RADIOLOGY | Facility: HOSPITAL | Age: 62
Discharge: HOME | End: 2022-10-05
Attending: ORTHOPAEDIC SURGERY
Payer: MEDICAID

## 2022-10-05 ENCOUNTER — OFFICE VISIT (OUTPATIENT)
Dept: ORTHOPEDICS | Facility: CLINIC | Age: 62
End: 2022-10-05
Payer: MEDICAID

## 2022-10-05 VITALS — HEIGHT: 65 IN | WEIGHT: 242.31 LBS | BODY MASS INDEX: 40.37 KG/M2

## 2022-10-05 DIAGNOSIS — M54.2 CHRONIC CERVICAL PAIN: ICD-10-CM

## 2022-10-05 DIAGNOSIS — G89.29 CHRONIC CERVICAL PAIN: ICD-10-CM

## 2022-10-05 DIAGNOSIS — M54.16 LUMBAR RADICULOPATHY: Primary | ICD-10-CM

## 2022-10-05 DIAGNOSIS — M54.12 CERVICAL RADICULOPATHY: ICD-10-CM

## 2022-10-05 DIAGNOSIS — M50.30 DDD (DEGENERATIVE DISC DISEASE), CERVICAL: ICD-10-CM

## 2022-10-05 DIAGNOSIS — G95.9 MYELOPATHY: ICD-10-CM

## 2022-10-05 PROCEDURE — 3008F BODY MASS INDEX DOCD: CPT | Mod: CPTII,,, | Performed by: PHYSICIAN ASSISTANT

## 2022-10-05 PROCEDURE — 72050 X-RAY EXAM NECK SPINE 4/5VWS: CPT | Mod: TC

## 2022-10-05 PROCEDURE — 99214 OFFICE O/P EST MOD 30 MIN: CPT | Mod: PBBFAC | Performed by: PHYSICIAN ASSISTANT

## 2022-10-05 PROCEDURE — 99999 PR PBB SHADOW E&M-EST. PATIENT-LVL IV: ICD-10-PCS | Mod: PBBFAC,,, | Performed by: PHYSICIAN ASSISTANT

## 2022-10-05 PROCEDURE — 1159F MED LIST DOCD IN RCRD: CPT | Mod: CPTII,,, | Performed by: PHYSICIAN ASSISTANT

## 2022-10-05 PROCEDURE — 1160F RVW MEDS BY RX/DR IN RCRD: CPT | Mod: CPTII,,, | Performed by: PHYSICIAN ASSISTANT

## 2022-10-05 PROCEDURE — 1160F PR REVIEW ALL MEDS BY PRESCRIBER/CLIN PHARMACIST DOCUMENTED: ICD-10-PCS | Mod: CPTII,,, | Performed by: PHYSICIAN ASSISTANT

## 2022-10-05 PROCEDURE — 99214 OFFICE O/P EST MOD 30 MIN: CPT | Mod: S$PBB,,, | Performed by: PHYSICIAN ASSISTANT

## 2022-10-05 PROCEDURE — 3008F PR BODY MASS INDEX (BMI) DOCUMENTED: ICD-10-PCS | Mod: CPTII,,, | Performed by: PHYSICIAN ASSISTANT

## 2022-10-05 PROCEDURE — 99999 PR PBB SHADOW E&M-EST. PATIENT-LVL IV: CPT | Mod: PBBFAC,,, | Performed by: PHYSICIAN ASSISTANT

## 2022-10-05 PROCEDURE — 1159F PR MEDICATION LIST DOCUMENTED IN MEDICAL RECORD: ICD-10-PCS | Mod: CPTII,,, | Performed by: PHYSICIAN ASSISTANT

## 2022-10-05 PROCEDURE — 72050 X-RAY EXAM NECK SPINE 4/5VWS: CPT | Mod: 26,,, | Performed by: RADIOLOGY

## 2022-10-05 PROCEDURE — 99214 PR OFFICE/OUTPT VISIT, EST, LEVL IV, 30-39 MIN: ICD-10-PCS | Mod: S$PBB,,, | Performed by: PHYSICIAN ASSISTANT

## 2022-10-05 PROCEDURE — 72050 XR CERVICAL SPINE AP LAT WITH FLEX EXTEN: ICD-10-PCS | Mod: 26,,, | Performed by: RADIOLOGY

## 2022-10-07 ENCOUNTER — PATIENT MESSAGE (OUTPATIENT)
Dept: ORTHOPEDICS | Facility: CLINIC | Age: 62
End: 2022-10-07
Payer: MEDICAID

## 2022-10-07 NOTE — PROGRESS NOTES
DATE: 10/7/2022  PATIENT: Leann Bethea    Supervising Physician: Denny Osorio M.D.    CHIEF COMPLAINT: neck and back pain    HISTORY:  Leann Bethea is a 62 y.o. female here for initial evaluation of low back and right leg pain as well as neck and left shoulder pain.  The pain has been present for many years. The patient describes the pain as aching and sharp.  The pain is worse with activity and improved by nothing in particular. There is associated numbness and tingling. There is subjective weakness. Prior treatments have included medications and physical therapy, but no ESIs or surgery.  She also reports difficulty with balance.       The patient denies myelopathic symptoms such as handwriting changes or difficulty with buttons/coins/keys. However she does report weakness in her hands and dropping things.  Denies perineal paresthesias, bowel dysfunction.  She reports urinary frequency.     PAST MEDICAL/SURGICAL HISTORY:  Past Medical History:   Diagnosis Date    Depression     Hiatal hernia     Hypertension      Past Surgical History:   Procedure Laterality Date    bladder lift      CYST REMOVAL      HYSTERECTOMY      partia    JOINT REPLACEMENT         Medications:   Current Outpatient Medications on File Prior to Visit   Medication Sig Dispense Refill    amLODIPine (NORVASC) 10 MG tablet Take 10 mg by mouth once daily.      cyclobenzaprine (FLEXERIL) 10 MG tablet Take 10 mg by mouth 3 (three) times daily as needed for Muscle spasms.      hydrocodone-acetaminophen 5-325mg (NORCO) 5-325 mg per tablet Take 1 tablet by mouth every 6 (six) hours as needed for Pain.      lisinopril 10 MG tablet Take 10 mg by mouth once daily.      metFORMIN (GLUCOPHAGE-XR) 500 MG ER 24hr tablet Take 500 mg by mouth every morning.      paroxetine (PAXIL-CR) 12.5 MG 24 hr tablet Take 12.5 mg by mouth every morning.      meclizine (ANTIVERT) 25 mg tablet Take 1 tablet (25 mg total) by mouth every 8 (eight) hours as needed for  "Dizziness. 14 tablet 0    methocarbamol (ROBAXIN) 500 MG Tab Take 500 mg by mouth 4 (four) times daily.      pramoxine 1 % Foam Place rectally nightly. 15 g 0     No current facility-administered medications on file prior to visit.       Social History:   Social History     Socioeconomic History    Marital status: Single   Tobacco Use    Smoking status: Never    Smokeless tobacco: Never   Substance and Sexual Activity    Alcohol use: No    Drug use: No       REVIEW OF SYSTEMS:  Constitution: Negative. Negative for chills, fever and night sweats.   Cardiovascular: Negative for chest pain and syncope.   Respiratory: Negative for cough and shortness of breath.   Gastrointestinal: See HPI. Negative for nausea/vomiting. Negative for abdominal pain.  Genitourinary: See HPI. Negative for discoloration or dysuria.  Skin: Negative for dry skin, itching and rash.   Hematologic/Lymphatic: Negative for bleeding problem. Does not bruise/bleed easily.   Musculoskeletal: Negative for falls and muscle weakness.   Neurological: See HPI. No seizures.   Endocrine: Negative for polydipsia, polyphagia and polyuria.   Allergic/Immunologic: Negative for hives and persistent infections.     EXAM:  Ht 5' 5" (1.651 m)   Wt 109.9 kg (242 lb 4.6 oz)   BMI 40.32 kg/m²     PHYSICAL EXAMINATION:    General: The patient is a 62 y.o. female in no apparent distress, the patient is oriented to person, place and time.  Psych: Normal mood and affect  HEENT: Vision grossly intact, hearing intact to the spoken word.  Lungs: Respirations unlabored.  Gait: Normal station and gait, no difficulty with toe or heel walk.   Skin: Cervical skin and dorsal lumbar skin negative for rashes, lesions, hairy patches and surgical scars.    Range of motion: Cervical and lumbar range of motion is acceptable. There is mild tenderness to palpation of the paracervical muscles.  There is mild lumbar tenderness to palpation.  There is tenderness to palpation over multiple " trigger points.   Spinal Balance: Global saggital and coronal spinal balance acceptable, no significant for scoliosis and kyphosis.  Musculoskeletal: No pain with the range of motion of the bilateral shoulders and elbows. Normal bulk and contour of the bilateral hands.  No pain with the range of motion of the bilateral hips. Mild bilateral trochanteric tenderness to palpation.  Vascular: Bilateral upper and lower extremities warm and well perfused, radial pulses 2+ bilaterally, dorsalis pedis pulses 2+ bilaterally.  Neurological: Normal strength and tone in all major motor groups in the bilateral upper and lower extremities. Normal sensation to light touch in the C5-T1 and L2-S1 dermatomes bilaterally.  Deep tendon reflexes symmetric 2+ in the bilateral upper and lower extremities.  Negative Inverted Radial Reflex and Cedeno's bilaterally. Negative Babinski bilaterally. Positive  straight leg raise bilaterally, reproduces back pain.     IMAGING:   Today I personally reviewed AP, Lat and Flex/Ex  upright C-spine films that demonstrate prominent anterior osteophytes. .        Body mass index is 40.32 kg/m².    No results found for: HGBA1C      ASSESSMENT/PLAN:    Leann was seen today for pain and pain.    Diagnoses and all orders for this visit:    Lumbar radiculopathy  -     Cancel: X-Ray Lumbar Spine Ap Lateral w/Flex Ext; Future  -     MRI Spine Cervical-Thoracic-Lumbar Without Contrast (XPD); Future  -     MRI Spine Cervical-Thoracic-Lumbar Without Contrast (XPD)  -     X-Ray Lumbar Spine Ap Lateral w/Flex Ext; Future  -     X-Ray Lumbar Spine Ap Lateral w/Flex Ext    Chronic cervical pain  -     Ambulatory referral[facility]    Cervical radiculopathy  -     MRI Spine Cervical-Thoracic-Lumbar Without Contrast (XPD); Future  -     MRI Spine Cervical-Thoracic-Lumbar Without Contrast (XPD)    Myelopathy  -     MRI Spine Cervical-Thoracic-Lumbar Without Contrast (XPD); Future  -     MRI Spine  Cervical-Thoracic-Lumbar Without Contrast (XPD)      Today we discussed at length all of the different treatment options including anti-inflammatories, acetaminophen, rest, ice, heat, physical therapy including strengthening and stretching exercises, home exercises, ROM, aerobic conditioning, aqua therapy, other modalities including ultrasound, massage, and dry needling, epidural steroid injections and finally surgical intervention.      Discussed with Dr. Osorio.  The patient is having myelopathic symptoms. I would like to get an MRI spine as well as lumbar films. She would like to have this closer to home.  She will mail the disc for us to review.